# Patient Record
Sex: MALE | Race: WHITE | NOT HISPANIC OR LATINO | Employment: OTHER | URBAN - METROPOLITAN AREA
[De-identification: names, ages, dates, MRNs, and addresses within clinical notes are randomized per-mention and may not be internally consistent; named-entity substitution may affect disease eponyms.]

---

## 2017-04-12 ENCOUNTER — OFFICE VISIT (OUTPATIENT)
Dept: CARDIOLOGY | Facility: MEDICAL CENTER | Age: 82
End: 2017-04-12
Payer: COMMERCIAL

## 2017-04-12 VITALS
WEIGHT: 193 LBS | HEIGHT: 67 IN | DIASTOLIC BLOOD PRESSURE: 70 MMHG | SYSTOLIC BLOOD PRESSURE: 130 MMHG | HEART RATE: 74 BPM | BODY MASS INDEX: 30.29 KG/M2

## 2017-04-12 DIAGNOSIS — I25.2 MI, OLD: ICD-10-CM

## 2017-04-12 DIAGNOSIS — Z95.5 STENTED CORONARY ARTERY: Chronic | ICD-10-CM

## 2017-04-12 DIAGNOSIS — I25.10 CORONARY ARTERY DISEASE, NON-OCCLUSIVE: ICD-10-CM

## 2017-04-12 DIAGNOSIS — E78.00 HYPERCHOLESTEROLEMIA: Chronic | ICD-10-CM

## 2017-04-12 PROCEDURE — 4040F PNEUMOC VAC/ADMIN/RCVD: CPT | Mod: 8P | Performed by: INTERNAL MEDICINE

## 2017-04-12 PROCEDURE — G8417 CALC BMI ABV UP PARAM F/U: HCPCS | Performed by: INTERNAL MEDICINE

## 2017-04-12 PROCEDURE — 1101F PT FALLS ASSESS-DOCD LE1/YR: CPT | Mod: 8P | Performed by: INTERNAL MEDICINE

## 2017-04-12 PROCEDURE — 99214 OFFICE O/P EST MOD 30 MIN: CPT | Performed by: INTERNAL MEDICINE

## 2017-04-12 PROCEDURE — G8432 DEP SCR NOT DOC, RNG: HCPCS | Performed by: INTERNAL MEDICINE

## 2017-04-12 PROCEDURE — G8598 ASA/ANTIPLAT THER USED: HCPCS | Performed by: INTERNAL MEDICINE

## 2017-04-12 PROCEDURE — 1036F TOBACCO NON-USER: CPT | Performed by: INTERNAL MEDICINE

## 2017-04-12 ASSESSMENT — ENCOUNTER SYMPTOMS
PALPITATIONS: 0
DIZZINESS: 0
SHORTNESS OF BREATH: 0
MYALGIAS: 0
PND: 0
LOSS OF CONSCIOUSNESS: 0

## 2017-04-12 NOTE — MR AVS SNAPSHOT
"        Ayo Brinda   2017 8:15 AM   Office Visit   MRN: [id number]    Department:  [facility]   Dept Phone:  [phone]    Description:  Male : 1931   Provider:  Lopez Vernon M.D.           Reason for Visit     Follow-Up pt. states he had labs done at [facility] as of 2017     No Known Allergies      You were diagnosed with     Coronary artery disease, non-occlusive   [876941]       Stented coronary artery   [374152]       MI, old   [015991]       Hypercholesterolemia   [474556]         Vital Signs     Blood Pressure Pulse Height Weight Body Mass Index Smoking Status    130/70 mmHg 74 1.702 m (5' 7\") 87.544 kg (193 lb) 30.22 kg/m2 Former Smoker      Basic Information     Date Of Birth Sex Race Ethnicity Preferred Language    1931 Male White Non- English      Problem List              ICD-10-CM Priority Class Noted - Resolved    Stented coronary artery (Chronic) Z95.5 Medium  2012 - Present    High risk medication use (Chronic) Z79.899   2012 - Present    Fatigue (Chronic) R53.83   2012 - Present    Hypercholesterolemia (Chronic) E78.00 Medium  2012 - Present    Acute myocardial infarction, subendocardial infarction (CMS-HCC) (Chronic) I21.4 Medium  2012 - Present    Polymyalgia rheumatica (CMS-HCC) (Chronic) M35.3   2012 - Present    Unspecified hyperplasia of prostate without urinary obstruction and other lower urinary tract symptoms (LUTS) (Chronic) N40.0   2012 - Present    Encounter for long-term (current) use of steroids (Chronic) [id number]   2012 - Present    MI, old I25.2   2013 - Present    DIABETES MELLITUS    2013 - Present    DM (diabetes mellitus) (CMS-[facility]) E11.9   2015 - Present    Coronary artery disease, non-occlusive I25.10   2017 - Present      Health Maintenance        Date Due Completion Dates    A1C SCREENING 1931 ---    DIABETES MONOFILAMENT / LE EXAM 1931 ---    RETINAL SCREENING " 5/2/1949 ---    FASTING LIPID PROFILE 5/2/1949 ---    URINE ACR / MICROALBUMIN 5/2/1949 ---    IMM DTaP/Tdap/Td Vaccine (1 - Tdap) 5/2/1950 ---    COLONOSCOPY 5/2/1981 ---    IMM ZOSTER VACCINE 5/2/1991 ---    IMM PNEUMOCOCCAL 65+ (ADULT) LOW/MEDIUM RISK SERIES (1 of 2 - PCV13) 5/2/1996 ---    SERUM CREATININE 10/21/2016 10/21/2015, 6/30/2014            Current Immunizations     No immunizations on file.      Below and/or attached are the medications your provider expects you to take. Review all of your home medications and newly ordered medications with your provider and/or pharmacist. Follow medication instructions as directed by your provider and/or pharmacist. Please keep your medication list with you and share with your provider. Update the information when medications are discontinued, doses are changed, or new medications (including over-the-counter products) are added; and carry medication information at all times in the event of emergency situations     Allergies:  No Known Allergies          Medications  Valid as of: April 12, 2017 -  8:42 AM    Generic Name Brand Name Tablet Size Instructions for use    Aspirin (Tablet Delayed Response) ECOTRIN 81 MG Take 81 mg by mouth every day.        Calcium Citrate   Take  by mouth 3 times a day.        Cholecalciferol (Tab) cholecalciferol 1000 UNIT Take 1,000 Units by mouth 2 Times a Day.        Ferrous Sulfate   by Does not apply route. As directed         MetFORMIN HCl (Tab) GLUCOPHAGE 500 MG Take 500 mg by mouth every day.        Methotrexate Sodium (Tab) methotrexate 2.5 MG Take 2.5 mg by mouth. 7 TABS ON WEDNESDAYS        Metoprolol Tartrate (Tab) LOPRESSOR 25 MG TAKE 1 TABLET BY MOUTH TWICE A DAY        Multiple Vitamins-Minerals   Take  by mouth. As directed         Nitroglycerin (SL Tab) NITROSTAT 0.4 MG Place 1 Tab under tongue as needed for Chest Pain.        Pravastatin Sodium (Tab) PRAVACHOL 10 MG TAKE 1 TABLET BY MOUTH EVERY DAY        SulfaSALAzine  (Tab) AZULFIDINE 500 MG Take 500 mg by mouth 2 Times a Day. 4 TAB per day        Tamsulosin HCl (Cap) FLOMAX 0.4 MG Take 0.4 mg by mouth ONE-HALF HOUR AFTER BREAKFAST.        .                 Medicines prescribed today were sent to:     [id number] - [place], [place] - [id number] [facility] [place] [address]    Phone: [phone] Fax: [phone]    Open 24 Hours?: No      Medication refill instructions:       If your prescription bottle indicates you have medication refills left, it is not necessary to call your provider’s office. Please contact your pharmacy and they will refill your medication.    If your prescription bottle indicates you do not have any refills left, you may request refills at any time through one of the following ways: The online TreatFeed system (except Urgent Care), by calling your provider’s office, or by asking your pharmacy to contact your provider’s office with a refill request. Medication refills are processed only during regular business hours and may not be available until the next business day. Your provider may request additional information or to have a follow-up visit with you prior to refilling your medication.   *Please Note: Medication refills are assigned a new Rx number when refilled electronically. Your pharmacy may indicate that no refills were authorized even though a new prescription for the same medication is available at the pharmacy. Please request the medicine by name with the pharmacy before contacting your provider for a refill.           TreatFeed Access Code: [email or web address]  Expires: 5/12/2017  8:42 AM    TreatFeed  A secure, online tool to manage your health information     [employer] TreatFeed® is a secure, online tool that connects you to your personalized health information from the privacy of your home -- day or night - making it very easy for you to manage your healthcare. Once the activation process is completed, you can even  access your medical information using the [employer] kevin, which is available for free in the Apple Kevin store or Google Play store.     [employer] provides the following levels of access (as shown below):   My Chart Features   [facility] Doctor [facility]  Specialists [facility]  Urgent  Care Non-[facility]  Primary Care  Doctor   Email your healthcare team securely and privately 24/7 X X X    Manage appointments: schedule your next appointment; view details of past/upcoming appointments X      Request prescription refills. X      View recent personal medical records, including lab and immunizations X X X X   View health record, including health history, allergies, medications X X X X   Read reports about your outpatient visits, procedures, consult and ER notes X X X X   See your discharge summary, which is a recap of your hospital and/or ER visit that includes your diagnosis, lab results, and care plan. X X       How to register for [employer]:  1. Go to  [email or web address].  2. Click on the Sign Up Now box, which takes you to the New Member Sign Up page. You will need to provide the following information:  a. Enter your [employer] Access Code exactly as it appears at the top of this page. (You will not need to use this code after you’ve completed the sign-up process. If you do not sign up before the expiration date, you must request a new code.)   b. Enter your date of birth.   c. Enter your home email address.   d. Click Submit, and follow the next screen’s instructions.  3. Create a [employer] ID. This will be your [employer] login ID and cannot be changed, so think of one that is secure and easy to remember.  4. Create a [employer] password. You can change your password at any time.  5. Enter your Password Reset Question and Answer. This can be used at a later time if you forget your password.   6. Enter your e-mail address. This allows you to receive e-mail notifications when new information is available in [employer].  7. Click  Sign Up. You can now view your health information.    For assistance activating your [employer] account, call [phone]

## 2017-04-13 ENCOUNTER — TELEPHONE (OUTPATIENT)
Dept: CARDIOLOGY | Facility: MEDICAL CENTER | Age: 82
End: 2017-04-13

## 2017-05-31 ENCOUNTER — RX ONLY (OUTPATIENT)
Age: 82
Setting detail: RX ONLY
End: 2017-05-31

## 2017-12-02 DIAGNOSIS — E78.00 HYPERCHOLESTEROLEMIA: ICD-10-CM

## 2017-12-06 RX ORDER — PRAVASTATIN SODIUM 10 MG
TABLET ORAL
Qty: 90 TAB | Refills: 2 | Status: SHIPPED | OUTPATIENT
Start: 2017-12-06

## 2021-01-14 DIAGNOSIS — Z23 NEED FOR VACCINATION: ICD-10-CM
